# Patient Record
Sex: FEMALE | Race: OTHER | HISPANIC OR LATINO | Employment: FULL TIME | URBAN - METROPOLITAN AREA
[De-identification: names, ages, dates, MRNs, and addresses within clinical notes are randomized per-mention and may not be internally consistent; named-entity substitution may affect disease eponyms.]

---

## 2018-12-22 ENCOUNTER — OFFICE VISIT (OUTPATIENT)
Dept: URGENT CARE | Age: 38
End: 2018-12-22
Payer: COMMERCIAL

## 2018-12-22 VITALS
WEIGHT: 186 LBS | HEIGHT: 61 IN | OXYGEN SATURATION: 98 % | RESPIRATION RATE: 16 BRPM | SYSTOLIC BLOOD PRESSURE: 114 MMHG | TEMPERATURE: 97.8 F | HEART RATE: 68 BPM | DIASTOLIC BLOOD PRESSURE: 72 MMHG | BODY MASS INDEX: 35.12 KG/M2

## 2018-12-22 DIAGNOSIS — R53.81 MALAISE: ICD-10-CM

## 2018-12-22 DIAGNOSIS — R55 PRE-SYNCOPE: Primary | ICD-10-CM

## 2018-12-22 PROCEDURE — G0383 LEV 4 HOSP TYPE B ED VISIT: HCPCS | Performed by: FAMILY MEDICINE

## 2018-12-22 PROCEDURE — 93005 ELECTROCARDIOGRAM TRACING: CPT | Performed by: PHYSICIAN ASSISTANT

## 2018-12-23 NOTE — PROGRESS NOTES
Friday 2 nights ago, Pt  Got up to go to the bathroom and things started spinning and she broke out into a sweat  Has had this before  just before she would get her back pain  Still feeling woozy and feels she is gasping for breaths

## 2018-12-23 NOTE — PROGRESS NOTES
[facility]    NAME: Octavio Juarez is a 45 y o  female  : 1980    MRN: [id number]  DATE: 2018  TIME: 8:42 PM    Assessment and Plan   Pre-syncope [R55]  1  Pre-syncope  POCT ECG   2  Malaise  POCT ECG     EKG:  No acute changes  No arrhythmias  No ST T wave changes  Normal sinus rhythm  Patient Instructions   Patient Instructions   Rest   Push fluids  Appetite as tolerated  Avoid getting up from a seated or laying position too quickly  If worsening symptoms, proceed to the emergency room for further evaluation which might include laboratory testing and repeat EKG  Otherwise call primary care provider office as soon as possible to arrange follow-up evaluation this next week  Chief Complaint     Chief Complaint   Patient presents with    Shortness of Breath    Dizziness       History of Present Illness   Octavio Juarez presents to the clinic c/o  60-year-old female that has had a couple episodes of dizziness diaphoresis and shortness of breath  She noted the episode on Thursday night after she got up out of bed at around midnight to use the restroom  She said she felt like she was going to black out, broke out in a cold sweat and felt short of breath  Earlier in the evening she did not have any supper could she felt a little indigestion  She drank some ice tea  Her son did give her some orange juice but that did not seem to help her symptoms  She says that she has felt just a little off since then  No personal history of diabetes or cardiac disease  Shortness of Breath     Dizziness   Associated symptoms include fatigue  Pertinent negatives include no coughing or joint swelling  Review of Systems   Review of Systems   Constitutional: Positive for fatigue  HENT: Negative  Eyes: Negative  Respiratory: Positive for shortness of breath  Negative for cough, choking and chest tightness  Cardiovascular: Negative      Musculoskeletal: Negative for gait problem and joint swelling  History of back pain but not current problem   Skin: Negative  Neurological: Positive for dizziness  Hematological:        History of anemia  during pregnancy but not currently  Current Medications     No long-term prescriptions on file  Current Allergies     Allergies as of 12/22/2018    (No Known Allergies)          The following portions of the patient's history were reviewed and updated as appropriate: allergies, current medications, past family history, past medical history, past social history, past surgical history and problem list   Past Medical History:   Diagnosis Date    Lower back pain      Past Surgical History:   Procedure Laterality Date    CERVICAL CONE BIOPSY      TUBAL LIGATION       Family History   Problem Relation Age of Onset    No Known Problems Mother     No Known Problems Father        Objective   /72 (BP Location: Left arm, Patient Position: Sitting, Cuff Size: Large)   Pulse 68   Temp 97 8 °F (36 6 °C) (Temporal)   Resp 16   Ht 5' 1" (1 549 m)   Wt 84 4 kg (186 lb)   LMP 12/15/2018   SpO2 98%   BMI 35 14 kg/m²        Physical Exam     Physical Exam   Constitutional: She is oriented to person, place, and time  She appears well-developed and well-nourished  No distress  HENT:   Head: Normocephalic  Right Ear: External ear normal    Left Ear: External ear normal    Mouth/Throat: Oropharynx is clear and moist  No oropharyngeal exudate  Eyes: Pupils are equal, round, and reactive to light  Conjunctivae and EOM are normal  Right eye exhibits no discharge  Left eye exhibits discharge  No scleral icterus  Neck: Normal range of motion  Neck supple  No JVD present  No tracheal deviation present  No thyromegaly present  Cardiovascular: Normal rate, regular rhythm and normal heart sounds  Exam reveals no gallop and no friction rub  No murmur heard    Pulmonary/Chest: Effort normal and breath sounds normal  No stridor  No respiratory distress  She has no wheezes  She has no rales  Abdominal: She exhibits no distension  There is no tenderness  Musculoskeletal: She exhibits no edema or tenderness  Lymphadenopathy:     She has no cervical adenopathy  Neurological: She is alert and oriented to person, place, and time  Skin: Skin is warm and dry  She is not diaphoretic  Psychiatric: She has a normal mood and affect

## 2018-12-23 NOTE — PATIENT INSTRUCTIONS
Rest   Push fluids  Appetite as tolerated  Avoid getting up from a seated or laying position too quickly  If worsening symptoms, proceed to the emergency room for further evaluation which might include laboratory testing and repeat EKG  Otherwise call primary care provider office as soon as possible to arrange follow-up evaluation this next week

## 2018-12-24 LAB
ATRIAL RATE: 0 BPM
ATRIAL RATE: 67 BPM
P AXIS: 46 DEGREES
PR INTERVAL: 128 MS
QRS AXIS: 0 DEGREES
QRS AXIS: 5 DEGREES
QRSD INTERVAL: 0 MS
QRSD INTERVAL: 94 MS
QT INTERVAL: 0 MS
QT INTERVAL: 414 MS
QTC INTERVAL: 0 MS
QTC INTERVAL: 437 MS
T WAVE AXIS: 0 DEGREES
T WAVE AXIS: 13 DEGREES
VENTRICULAR RATE: 0 BPM
VENTRICULAR RATE: 67 BPM

## 2018-12-24 PROCEDURE — 93010 ELECTROCARDIOGRAM REPORT: CPT | Performed by: INTERNAL MEDICINE

## 2018-12-30 ENCOUNTER — OFFICE VISIT (OUTPATIENT)
Dept: URGENT CARE | Age: 38
End: 2018-12-30
Payer: COMMERCIAL

## 2018-12-30 VITALS
BODY MASS INDEX: 35.12 KG/M2 | HEIGHT: 61 IN | RESPIRATION RATE: 16 BRPM | TEMPERATURE: 99.1 F | OXYGEN SATURATION: 98 % | SYSTOLIC BLOOD PRESSURE: 100 MMHG | DIASTOLIC BLOOD PRESSURE: 72 MMHG | WEIGHT: 186 LBS | HEART RATE: 98 BPM

## 2018-12-30 DIAGNOSIS — J01.90 ACUTE SINUSITIS, RECURRENCE NOT SPECIFIED, UNSPECIFIED LOCATION: Primary | ICD-10-CM

## 2018-12-30 PROCEDURE — G0382 LEV 3 HOSP TYPE B ED VISIT: HCPCS | Performed by: FAMILY MEDICINE

## 2018-12-30 PROCEDURE — 99213 OFFICE O/P EST LOW 20 MIN: CPT | Performed by: FAMILY MEDICINE

## 2018-12-30 RX ORDER — AMOXICILLIN AND CLAVULANATE POTASSIUM 875; 125 MG/1; MG/1
1 TABLET, FILM COATED ORAL EVERY 12 HOURS SCHEDULED
Qty: 14 TABLET | Refills: 0 | Status: SHIPPED | OUTPATIENT
Start: 2018-12-30 | End: 2019-01-06

## 2018-12-30 RX ORDER — AMOXICILLIN AND CLAVULANATE POTASSIUM 875; 125 MG/1; MG/1
1 TABLET, FILM COATED ORAL EVERY 12 HOURS SCHEDULED
Qty: 20 TABLET | Refills: 0 | Status: SHIPPED | COMMUNITY
Start: 2018-12-30 | End: 2018-12-30 | Stop reason: ALTCHOICE

## 2018-12-31 NOTE — PROGRESS NOTES
[facility]        NAME: Michaell Nyhan is a 45 y o  female  : 1980    MRN: [id number]  DATE: 2018  TIME: 8:15 PM    Assessment and Plan   Acute sinusitis, recurrence not specified, unspecified location [J01 90]  1  Acute sinusitis, recurrence not specified, unspecified location  amoxicillin-clavulanate (AUGMENTIN) 875-125 mg per tablet    DISCONTINUED: amoxicillin-clavulanate (AUGMENTIN) 875-125 mg per tablet         Patient Instructions       Follow up with PCP in 3-5 days  Proceed to  ER if symptoms worsen  Chief Complaint     Chief Complaint   Patient presents with    Sinusitis         History of Present Illness       Patient here for evaluation of sinus congestion, pressure, headache  Review of Systems   Review of Systems      Current Medications       Current Outpatient Prescriptions:     amoxicillin-clavulanate (AUGMENTIN) 875-125 mg per tablet, Take 1 tablet by mouth every 12 (twelve) hours for 7 days, Disp: 14 tablet, Rfl: 0    methylPREDNISolone (MEDROL DOSEPACK) 4 MG tablet, follow package directions (Patient not taking: Reported on 2018 ), Disp: 21 tablet, Rfl: 0    Current Allergies     Allergies as of 2018    (No Known Allergies)            The following portions of the patient's history were reviewed and updated as appropriate: allergies, current medications, past family history, past medical history, past social history, past surgical history and problem list      Past Medical History:   Diagnosis Date    Lower back pain        Past Surgical History:   Procedure Laterality Date    CERVICAL CONE BIOPSY      TUBAL LIGATION         Family History   Problem Relation Age of Onset    No Known Problems Mother     No Known Problems Father          Medications have been verified          Objective   /72 (BP Location: Left arm, Patient Position: Sitting, Cuff Size: Large)   Pulse 98   Temp 99 1 °F (37 3 °C) (Temporal)   Resp 16   Ht 5' 1" (1 549 m)   Wt 84 4 kg (186 lb)   LMP 12/15/2018   SpO2 98%   BMI 35 14 kg/m²        Physical Exam     Physical Exam   Constitutional: She is oriented to person, place, and time  She appears well-developed and well-nourished  No distress  HENT:   Head: Normocephalic and atraumatic  Right Ear: External ear normal    Left Ear: External ear normal    TMs intact bilaterally with no fluid in the middle ear bilaterally  Bilateral tonsillar erythema with no soft tissue swelling  No exudate  Bilateral nasal congestion and erythema with mucopurulent drainage present  Bilateral maxillary sinus tenderness present  Eyes: Pupils are equal, round, and reactive to light  Conjunctivae and EOM are normal    Pulmonary/Chest: Effort normal and breath sounds normal  No respiratory distress  She has no wheezes  She has no rales  Lymphadenopathy:     She has cervical adenopathy  Neurological: She is alert and oriented to person, place, and time  Skin: Skin is warm and dry  Psychiatric: She has a normal mood and affect  Her behavior is normal    Nursing note and vitals reviewed